# Patient Record
Sex: MALE | Employment: FULL TIME | URBAN - METROPOLITAN AREA
[De-identification: names, ages, dates, MRNs, and addresses within clinical notes are randomized per-mention and may not be internally consistent; named-entity substitution may affect disease eponyms.]

---

## 2022-01-18 ENCOUNTER — OFFICE VISIT (OUTPATIENT)
Dept: FAMILY MEDICINE | Facility: CLINIC | Age: 27
End: 2022-01-18

## 2022-01-18 VITALS
DIASTOLIC BLOOD PRESSURE: 68 MMHG | HEART RATE: 80 BPM | TEMPERATURE: 98.4 F | HEIGHT: 70 IN | BODY MASS INDEX: 28.79 KG/M2 | SYSTOLIC BLOOD PRESSURE: 112 MMHG | RESPIRATION RATE: 18 BRPM | WEIGHT: 201.13 LBS

## 2022-01-18 DIAGNOSIS — R30.0 DYSURIA: Primary | ICD-10-CM

## 2022-01-18 DIAGNOSIS — R36.9 ABNORMAL PENILE DISCHARGE: ICD-10-CM

## 2022-01-18 PROCEDURE — 99203 OFFICE O/P NEW LOW 30 MIN: CPT | Performed by: FAMILY MEDICINE

## 2022-01-18 PROCEDURE — 96372 THER/PROPH/DIAG INJ SC/IM: CPT | Performed by: FAMILY MEDICINE

## 2022-01-18 PROCEDURE — 87077 CULTURE AEROBIC IDENTIFY: CPT | Performed by: FAMILY MEDICINE

## 2022-01-18 PROCEDURE — 87070 CULTURE OTHR SPECIMN AEROBIC: CPT | Performed by: FAMILY MEDICINE

## 2022-01-18 RX ORDER — DOXYCYCLINE 100 MG/1
100 CAPSULE ORAL 2 TIMES DAILY
Qty: 14 CAPSULE | Refills: 0 | Status: SHIPPED | OUTPATIENT
Start: 2022-01-18

## 2022-01-18 RX ORDER — CEFTRIAXONE SODIUM 1 G
1 VIAL (EA) INJECTION ONCE
Status: COMPLETED | OUTPATIENT
Start: 2022-01-18 | End: 2022-01-18

## 2022-01-18 RX ADMIN — Medication 1 G: at 14:55

## 2022-01-18 ASSESSMENT — MIFFLIN-ST. JEOR: SCORE: 1916.04

## 2022-01-18 NOTE — PROGRESS NOTES
ASSESSMENT and plan   1. Dysuria  Patient is noted burning while urinating for the last 5 days and never had the symptoms before.  He will be treated empirically for gonorrhea and chlamydia with Rocephin and doxycycline  - NEISSERIA GONORRHOEA PCR; Future  - CHLAMYDIA TRACHOMATIS PCR; Future  - HIV Antigen Antibody Combo; Future  - Treponema Abs w Reflex to RPR and Titer; Future  - Hepatitis B Surface Antibody; Future  - doxycycline hyclate (VIBRAMYCIN) 100 MG capsule; Take 1 capsule (100 mg) by mouth 2 times daily  Dispense: 14 capsule; Refill: 0  - cefTRIAXone (ROCEPHIN) injection 1 g  - Swab Aerobic Bacterial Culture Routine    2. Abnormal penile discharge  Abnormal discharge noted on examination at the surface of the penis swab taken  - Swab Aerobic Bacterial Culture Routine    There are no Patient Instructions on file for this visit.    Orders Placed This Encounter   Procedures     HIV Antigen Antibody Combo     Treponema Abs w Reflex to RPR and Titer     Hepatitis B Surface Antibody     There are no discontinued medications.    Return in about 6 months (around 7/18/2022) for Routine preventive.    CHIEF COMPLAINT:  chief complaint    HISTORY OF PRESENT ILLNESS:  Yobani is a 26 year old male visiting our clinic for the first time, he is here because he had unprotected sex with an unknown partner more than a week ago.  Over that time he has noticed an abnormal discharge from his penis that is never been present before the discharge is always present he also notes that he has burning when he urinates and a discomfort on the shaft of his penis.  He notes no other other complaints has never had these complaints before has been unable to contact his former partner.  He has not noticed any fever chills abdominal pain.  His appetites been normal.  No skin rashes or skin lesions have been noted.  He notes no other sexual contact during this time    REVIEW OF SYSTEMS:    positive for penile discomfort burning while  "micturating and abnormal genital discharge  Ten point review of  All other systems are negative.    PFSH:    Social history reviewed    TOBACCO USE:  History   Smoking Status     Never Smoker   Smokeless Tobacco     Never Used       VITALS:  Vitals:    01/18/22 1321   BP: 112/68   Pulse: 80   Resp: 18   Temp: 98.4  F (36.9  C)   TempSrc: Oral   Weight: 91.2 kg (201 lb 2 oz)   Height: 1.79 m (5' 10.47\")     Wt Readings from Last 3 Encounters:   01/18/22 91.2 kg (201 lb 2 oz)       PHYSICAL EXAM:  Interactive male sitting comfortably in exam room no acute distress  Lymphatic system no lymph enlargement noted in the groin   GI abdomen soft there is no focal tenderness in the lower abdominal area   circumcised penis with mucoid discharge present on the glans, there is no skin lesions noted of the shaft of the penis    DATA REVIEWED:  Additional History from Old Records Summarized (2): 0  Decision to Obtain Records (1): 0  Radiology Tests Summarized or Ordered (1): 0  Labs Reviewed or Ordered (1): 0  Medicine Test Summarized or Ordered (1): 0  Independent Review of EKG or X-RAY(2 each): 0    The visit lasted a total of 15 minutes.    MEDICATIONS:  Current Outpatient Medications   Medication Sig Dispense Refill     doxycycline hyclate (VIBRAMYCIN) 100 MG capsule Take 1 capsule (100 mg) by mouth 2 times daily 14 capsule 0       "

## 2022-01-20 LAB
BACTERIA SPEC CULT: ABNORMAL
BACTERIA SPEC CULT: ABNORMAL